# Patient Record
Sex: FEMALE | Race: WHITE | NOT HISPANIC OR LATINO | Employment: UNEMPLOYED | URBAN - METROPOLITAN AREA
[De-identification: names, ages, dates, MRNs, and addresses within clinical notes are randomized per-mention and may not be internally consistent; named-entity substitution may affect disease eponyms.]

---

## 2023-03-17 PROBLEM — E66.9 OBESITY (BMI 30-39.9): Status: ACTIVE | Noted: 2023-03-17

## 2023-03-17 PROBLEM — F32.A DEPRESSION: Status: ACTIVE | Noted: 2023-03-17

## 2023-03-17 PROBLEM — N94.9 ADNEXAL FULLNESS: Status: ACTIVE | Noted: 2023-03-17

## 2023-03-17 PROBLEM — R06.02 SOB (SHORTNESS OF BREATH): Status: ACTIVE | Noted: 2023-03-17

## 2023-03-17 PROBLEM — N63.20 LEFT BREAST MASS: Status: ACTIVE | Noted: 2023-03-17

## 2023-03-17 PROBLEM — N64.4 BREAST PAIN: Status: ACTIVE | Noted: 2023-03-17

## 2023-03-17 PROBLEM — E78.5 HYPERLIPIDEMIA: Status: ACTIVE | Noted: 2023-03-17

## 2023-03-17 PROBLEM — K80.20 GALLSTONES: Status: ACTIVE | Noted: 2023-03-17

## 2023-03-17 PROBLEM — E11.65 TYPE 2 DIABETES MELLITUS WITH HYPERGLYCEMIA, WITHOUT LONG-TERM CURRENT USE OF INSULIN (MULTI): Status: ACTIVE | Noted: 2023-03-17

## 2023-03-17 PROBLEM — R73.01 ELEVATED FASTING GLUCOSE: Status: ACTIVE | Noted: 2023-03-17

## 2023-03-17 PROBLEM — F20.9 SCHIZOPHRENIA (MULTI): Status: ACTIVE | Noted: 2023-03-17

## 2023-03-17 PROBLEM — F31.9: Status: ACTIVE | Noted: 2023-03-17

## 2023-03-17 PROBLEM — R10.2 ADNEXAL PAIN: Status: ACTIVE | Noted: 2023-03-17

## 2023-03-17 PROBLEM — R09.A2 GLOBUS SENSATION: Status: ACTIVE | Noted: 2023-03-17

## 2023-03-17 PROBLEM — R07.9 CHEST PAIN: Status: ACTIVE | Noted: 2023-03-17

## 2023-03-17 PROBLEM — R42 DIZZINESS: Status: ACTIVE | Noted: 2023-03-17

## 2023-03-17 PROBLEM — G47.33 OBSTRUCTIVE SLEEP APNEA SYNDROME: Status: ACTIVE | Noted: 2023-03-17

## 2023-03-17 PROBLEM — K11.7 XEROSTOMIA DUE TO MOUTH BREATHING: Status: ACTIVE | Noted: 2023-03-17

## 2023-03-17 PROBLEM — K76.0 FATTY LIVER: Status: ACTIVE | Noted: 2023-03-17

## 2023-03-17 PROBLEM — R42 VERTIGO: Status: ACTIVE | Noted: 2023-03-17

## 2023-03-17 PROBLEM — D72.829 LEUKOCYTOSIS: Status: ACTIVE | Noted: 2023-03-17

## 2023-03-17 PROBLEM — M10.9 GOUT, ARTHRITIS: Status: ACTIVE | Noted: 2023-03-17

## 2023-03-17 PROBLEM — S09.90XA CLOSED HEAD INJURY: Status: ACTIVE | Noted: 2023-03-17

## 2023-03-17 PROBLEM — E55.9 VITAMIN D DEFICIENCY: Status: ACTIVE | Noted: 2023-03-17

## 2023-03-17 PROBLEM — R14.0 BLOATING: Status: ACTIVE | Noted: 2023-03-17

## 2023-03-17 PROBLEM — R06.5 XEROSTOMIA DUE TO MOUTH BREATHING: Status: ACTIVE | Noted: 2023-03-17

## 2023-03-17 PROBLEM — R80.9 PROTEIN IN URINE: Status: ACTIVE | Noted: 2023-03-17
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
## 2023-03-17 PROBLEM — R05.9 COUGH: Status: ACTIVE | Noted: 2023-03-17

## 2023-03-17 PROBLEM — R07.81 RIB PAIN ON RIGHT SIDE: Status: ACTIVE | Noted: 2023-03-17

## 2023-03-17 PROBLEM — M10.9 GOUT: Status: ACTIVE | Noted: 2023-03-17

## 2023-03-28 ENCOUNTER — TELEMEDICINE (OUTPATIENT)
Dept: PHARMACY | Facility: HOSPITAL | Age: 68
End: 2023-03-28

## 2023-03-28 DIAGNOSIS — E11.65 TYPE 2 DIABETES MELLITUS WITH HYPERGLYCEMIA, WITHOUT LONG-TERM CURRENT USE OF INSULIN (MULTI): Primary | ICD-10-CM

## 2023-03-28 DIAGNOSIS — E11.65 TYPE 2 DIABETES MELLITUS WITH HYPERGLYCEMIA, WITHOUT LONG-TERM CURRENT USE OF INSULIN (MULTI): ICD-10-CM

## 2023-03-29 RX ORDER — SEMAGLUTIDE 1.34 MG/ML
1 INJECTION, SOLUTION SUBCUTANEOUS
Qty: 3 ML | Refills: 5 | Status: SHIPPED | OUTPATIENT
Start: 2023-03-29

## 2023-03-29 RX ORDER — INSULIN DEGLUDEC 200 U/ML
32 INJECTION, SOLUTION SUBCUTANEOUS NIGHTLY
Qty: 15 ML | Refills: 5 | Status: SHIPPED | OUTPATIENT
Start: 2023-03-29 | End: 2023-03-29

## 2023-03-29 NOTE — PROGRESS NOTES
Subjective   Patient ID: Edna Morillo is a 68 y.o. female who presents for Diabetes.    Referring Provider: Savanna Sandoval DO     Patient referred to clinical pharmacy for diabetes medication management. Controlled type 2 diabetes as evidence by A1c 6.2% on 10/10/22. Diabetes is complicated by hyperlipidemia, obesity, uncontrolled schizophrenia, proteinuria, SENTHIL, obesity, gout, and h/o falls.    Following visit with PCP on 4/26/22 patient stopped metformin and initiated Tresiba insulin 20 units once daily. On 5/31/22 her Tresiba was increased to 26 units daily and again to 32 units daily on 6/15, where she currently is at. After most recent PCP visit on 11/18, blood work from October showed A1c has trended down controlled to 6.2%. Today Santi (pt's /caregiver) mentions that Ozempic hasnt been taken the past two weeks. He is concerned it may be causing adverse effects. After brief discussion, benefit > harm for [place] to continue the Ozempic. Additionally, he is interested in the  PAP process to get her Tresiba and Ozempic for no cost after mentioning that he is fearful that these meds will be hundreds of dollars next time he fills because of the donut [facility]    Objective     There were no vitals taken for this visit.     Labs  Lab Results   Component Value Date    BILITOT 0.4 10/10/2022    CALCIUM 9.9 10/10/2022    CO2 30 10/10/2022     10/10/2022    CREATININE 1.31 (H) 10/10/2022    GLUCOSE 124 (H) 10/10/2022    ALKPHOS 96 10/10/2022    K 4.6 10/10/2022    PROT 6.4 10/10/2022     10/10/2022    AST 12 10/10/2022    ALT 13 10/10/2022    BUN 22 10/10/2022    ANIONGAP 12 10/10/2022    ALBUMIN 3.6 10/10/2022    GFRF 44 (A) 10/10/2022     Lab Results   Component Value Date    TRIG 178 (H) 10/10/2022    CHOL 129 10/10/2022    HDL 44.4 10/10/2022     Lab Results   Component Value Date    HGBA1C 6.2 (A) 10/10/2022     Current Outpatient Medications on File Prior to Visit   Medication Sig Dispense  "Refill    atorvastatin (Lipitor) 20 mg tablet Take 1 tablet (20 mg) by mouth once daily.      carvedilol (Coreg) 6.25 mg tablet TAKE 1 TABLET BY MOUTH TWICE A DAY *NEED APPT 60 tablet 0    carvedilol (Coreg) 6.25 mg tablet Take 1 tablet (6.25 mg) by mouth in the morning and 1 tablet (6.25 mg) in the evening. Take with meals.      cholecalciferol (Vitamin D-3) 125 MCG (5000 UT) capsule Take 1 capsule (125 mcg) by mouth once daily.      colchicine 0.6 mg tablet Take 1 tablet (0.6 mg) by mouth once daily.      Easy Touch 32 gauge x 3/16\" needle USE AS DIRECTED WITH INSULIN PENS      FreeStyle John 2 Sensor kit USE TO TEST ONCE DAILY AND CHANGE EVERY 14 DAYS      lithium 300 mg capsule Take 1 capsule (300 mg) by mouth in the morning and 1 capsule (300 mg) before bedtime.      metFORMIN (Glucophage) 500 mg tablet Take 1 tablet (500 mg) by mouth once daily.      nitroglycerin (Nitrostat) 0.4 mg SL tablet Place 1 tablet (0.4 mg) under the tongue every 5 minutes if needed for chest pain.      Ozempic 1 mg/dose (4 mg/3 mL) pen injector Inject 1 mg under the skin 1 (one) time per week.      perphenazine 8 mg tablet Take 1 tablet (8 mg) by mouth once daily at bedtime.      Tresiba FlexTouch U-200 200 unit/mL (3 mL) injection Inject 30 Units under the skin once daily at bedtime.      venlafaxine XR (Effexor-XR) 150 mg 24 hr capsule Take 2 capsules (300 mg) by mouth once daily.      ziprasidone (Geodon) 80 mg capsule Take 1 capsule (80 mg) by mouth once daily.       No current facility-administered medications on file prior to visit.       Assessment/Plan   Problem List Items Addressed This Visit          Endocrine/Metabolic    Type 2 diabetes mellitus with hyperglycemia, without long-term current use of insulin (CMS/[facility])     1. Continue taking Ozempic 1 mg SQ once weekly and Tresiba 32 units daily - both Rxs sent to [facility] pharmacy to start  PAP process  2 Continue all other meds prescribed by PCP  3. Medications are " dosed appropriately for current renal and hepatic function, continue to monitor as most recent BW shows renal functioned has worsened     Clinical Pharmacist follow-up: 8 weeks  Type of Encounter: Virtual    Thank you,   Tenzin Carmona, PharmD, BCPS

## 2023-04-28 ENCOUNTER — TELEMEDICINE (OUTPATIENT)
Dept: PHARMACY | Facility: HOSPITAL | Age: 68
End: 2023-04-28

## 2023-04-28 DIAGNOSIS — E11.65 TYPE 2 DIABETES MELLITUS WITH HYPERGLYCEMIA, WITHOUT LONG-TERM CURRENT USE OF INSULIN (MULTI): ICD-10-CM

## 2023-04-28 NOTE — PROGRESS NOTES
Subjective   Patient ID: Edna Morillo is a 68 y.o. female who presents for Diabetes.    Referring Provider: Savanna Sandoval DO     Patient referred to clinical pharmacy for diabetes medication management. Controlled type 2 diabetes as evidence by A1c 6.2% on 10/10/22. Diabetes is complicated by hyperlipidemia, obesity, uncontrolled schizophrenia, proteinuria, SENTHIL, obesity, gout, and h/o falls.     At previous pharmacy follow up patient's  Santi mentioned he was interested in  PAP. PAP team reached out a few weeks ago and explained financial assistance. He was not told of financial proof needed to complete PAP process. Last visit he also mentioned that there were concerns over Ozempic causing adverse effects. Nausea, back pain and feeling bed ridden for a few days were reported. This was complicated by recent fall patient had a few weeks prior. Santi mentions [place] feels better and has improved significantly, does not think ozempic is contributing to side effects at all. One episode of hypoglycemia reported, jigar alarm was ~60. No other concerns at this time.     Objective     There were no vitals taken for this visit.     Labs  Lab Results   Component Value Date    BILITOT 0.4 10/10/2022    CALCIUM 9.9 10/10/2022    CO2 30 10/10/2022     10/10/2022    CREATININE 1.31 (H) 10/10/2022    GLUCOSE 124 (H) 10/10/2022    ALKPHOS 96 10/10/2022    K 4.6 10/10/2022    PROT 6.4 10/10/2022     10/10/2022    AST 12 10/10/2022    ALT 13 10/10/2022    BUN 22 10/10/2022    ANIONGAP 12 10/10/2022    ALBUMIN 3.6 10/10/2022    GFRF 44 (A) 10/10/2022     Lab Results   Component Value Date    TRIG 178 (H) 10/10/2022    CHOL 129 10/10/2022    HDL 44.4 10/10/2022     Lab Results   Component Value Date    HGBA1C 6.2 (A) 10/10/2022     Current Outpatient Medications on File Prior to Visit   Medication Sig Dispense Refill    atorvastatin (Lipitor) 20 mg tablet Take 1 tablet (20 mg) by mouth once daily.       "carvedilol (Coreg) 6.25 mg tablet Take 1 tablet (6.25 mg) by mouth 2 times a day with meals.      carvedilol (Coreg) 6.25 mg tablet TAKE 1 TABLET BY MOUTH TWICE A DAY *NEED APPT 60 tablet 0    cholecalciferol (Vitamin D-3) 125 MCG (5000 UT) capsule Take 1 capsule (125 mcg) by mouth once daily.      colchicine 0.6 mg tablet Take 1 tablet (0.6 mg) by mouth once daily.      Easy Touch 32 gauge x 3/16\" needle USE AS DIRECTED WITH INSULIN PENS      FreeStyle John 2 Sensor kit USE TO TEST ONCE DAILY AND CHANGE EVERY 14 DAYS      lithium 300 mg capsule Take 1 capsule (300 mg) by mouth 2 times a day.      metFORMIN (Glucophage) 500 mg tablet Take 1 tablet (500 mg) by mouth once daily.      nitroglycerin (Nitrostat) 0.4 mg SL tablet Place 1 tablet (0.4 mg) under the tongue every 5 minutes if needed for chest pain.      Ozempic 1 mg/dose (4 mg/3 mL) pen injector Inject 1 mg under the skin 1 (one) time per week. 3 mL 5    perphenazine 8 mg tablet Take 1 tablet (8 mg) by mouth once daily at bedtime.      Tresiba FlexTouch U-200 200 unit/mL (3 mL) injection Inject 32 Units under the skin once daily at bedtime. 15 mL 5    venlafaxine XR (Effexor-XR) 150 mg 24 hr capsule Take 2 capsules (300 mg) by mouth once daily.      ziprasidone (Geodon) 80 mg capsule Take 1 capsule (80 mg) by mouth once daily.       No current facility-administered medications on file prior to visit.         Assessment/Plan   Problem List Items Addressed This Visit       Type 2 diabetes mellitus with hyperglycemia, without long-term current use of insulin (CMS/[facility])     1. Continue taking Ozempic 1 mg SQ once weekly and Tresiba 32 units daily - both Rxs pending PAP approval. Patient to send pharmacy team income documents to get approval  2 Continue all other meds prescribed by PCP  3. Medications are dosed appropriately for current renal and hepatic function, continue to monitor as most recent BW shows renal functioned has worsened      Clinical Pharmacist " follow-up: 2-3 wks  Type of Encounter: Virtual     Thank you,   Tenzin Carmona, PharmD, BCPS

## 2023-05-05 DIAGNOSIS — Z00.00 ENCOUNTER FOR GENERAL ADULT MEDICAL EXAMINATION WITHOUT ABNORMAL FINDINGS: ICD-10-CM

## 2023-05-05 RX ORDER — CARVEDILOL 6.25 MG/1
TABLET ORAL
Qty: 60 TABLET | Refills: 0 | Status: SHIPPED | OUTPATIENT
Start: 2023-05-05 | End: 2023-05-31 | Stop reason: SDUPTHER

## 2023-05-26 DIAGNOSIS — E11.65 TYPE 2 DIABETES MELLITUS WITH HYPERGLYCEMIA, WITHOUT LONG-TERM CURRENT USE OF INSULIN (MULTI): Primary | ICD-10-CM

## 2023-05-26 DIAGNOSIS — E78.2 MIXED HYPERLIPIDEMIA: ICD-10-CM

## 2023-05-30 RX ORDER — ATORVASTATIN CALCIUM 20 MG/1
TABLET, FILM COATED ORAL
Qty: 90 TABLET | Refills: 3 | Status: SHIPPED | OUTPATIENT
Start: 2023-05-30

## 2023-05-31 DIAGNOSIS — Z00.00 ENCOUNTER FOR GENERAL ADULT MEDICAL EXAMINATION WITHOUT ABNORMAL FINDINGS: ICD-10-CM

## 2023-05-31 RX ORDER — CARVEDILOL 6.25 MG/1
TABLET ORAL
Qty: 60 TABLET | Refills: 0 | Status: SHIPPED | OUTPATIENT
Start: 2023-05-31 | End: 2023-06-26

## 2023-06-08 ENCOUNTER — TELEPHONE (OUTPATIENT)
Dept: PRIMARY CARE | Facility: CLINIC | Age: 68
End: 2023-06-08

## 2023-06-08 NOTE — TELEPHONE ENCOUNTER
Be she fell Monday afternoon and hit her head and is numb on one side and has been admitted in [facility], possible going into rehab, Her  wanted you to know, I told him to let us know when she is release because you will want to to a follow up appt

## 2023-06-25 DIAGNOSIS — Z00.00 ENCOUNTER FOR GENERAL ADULT MEDICAL EXAMINATION WITHOUT ABNORMAL FINDINGS: ICD-10-CM

## 2023-06-26 RX ORDER — CARVEDILOL 6.25 MG/1
TABLET ORAL
Qty: 60 TABLET | Refills: 0 | Status: SHIPPED | OUTPATIENT
Start: 2023-06-26 | End: 2023-07-24

## 2023-07-21 DIAGNOSIS — Z00.00 ENCOUNTER FOR GENERAL ADULT MEDICAL EXAMINATION WITHOUT ABNORMAL FINDINGS: ICD-10-CM

## 2023-07-24 RX ORDER — CARVEDILOL 6.25 MG/1
TABLET ORAL
Qty: 60 TABLET | Refills: 0 | Status: SHIPPED | OUTPATIENT
Start: 2023-07-24

## 2023-08-19 DIAGNOSIS — E11.65 TYPE 2 DIABETES MELLITUS WITH HYPERGLYCEMIA (MULTI): ICD-10-CM

## 2023-08-21 RX ORDER — PEN NEEDLE, DIABETIC 32 GX3/16"
NEEDLE, DISPOSABLE MISCELLANEOUS
Qty: 100 EACH | Refills: 1 | Status: SHIPPED | OUTPATIENT
Start: 2023-08-21

## 2023-12-07 DIAGNOSIS — E11.65 TYPE 2 DIABETES MELLITUS WITH HYPERGLYCEMIA (MULTI): ICD-10-CM

## 2023-12-07 RX ORDER — NAPROXEN SODIUM 220 MG/1
81 TABLET, FILM COATED ORAL
COMMUNITY
Start: 2018-10-22

## 2023-12-07 RX ORDER — METFORMIN HYDROCHLORIDE 500 MG/1
500 TABLET ORAL DAILY
Qty: 90 TABLET | Refills: 3 | Status: SHIPPED | OUTPATIENT
Start: 2023-12-07 | End: 2023-12-07

## 2023-12-07 RX ORDER — METFORMIN HYDROCHLORIDE 500 MG/1
500 TABLET ORAL DAILY
Qty: 90 TABLET | Refills: 3 | Status: SHIPPED | OUTPATIENT
Start: 2023-12-07

## 2023-12-07 RX ORDER — LANOLIN ALCOHOL/MO/W.PET/CERES
CREAM (GRAM) TOPICAL
COMMUNITY
Start: 2023-06-09